# Patient Record
Sex: FEMALE | Race: WHITE | NOT HISPANIC OR LATINO | Employment: OTHER | URBAN - METROPOLITAN AREA
[De-identification: names, ages, dates, MRNs, and addresses within clinical notes are randomized per-mention and may not be internally consistent; named-entity substitution may affect disease eponyms.]

---

## 2017-11-10 ENCOUNTER — TRANSCRIBE ORDERS (OUTPATIENT)
Dept: ADMINISTRATIVE | Facility: HOSPITAL | Age: 42
End: 2017-11-10

## 2017-11-10 DIAGNOSIS — Z12.31 VISIT FOR SCREENING MAMMOGRAM: Primary | ICD-10-CM

## 2017-12-21 ENCOUNTER — APPOINTMENT (OUTPATIENT)
Dept: MAMMOGRAPHY | Facility: HOSPITAL | Age: 42
End: 2017-12-21
Attending: OBSTETRICS & GYNECOLOGY

## 2018-01-26 ENCOUNTER — APPOINTMENT (OUTPATIENT)
Dept: MAMMOGRAPHY | Facility: HOSPITAL | Age: 43
End: 2018-01-26
Attending: OBSTETRICS & GYNECOLOGY

## 2018-02-22 ENCOUNTER — APPOINTMENT (OUTPATIENT)
Dept: MAMMOGRAPHY | Facility: HOSPITAL | Age: 43
End: 2018-02-22
Attending: OBSTETRICS & GYNECOLOGY

## 2018-11-07 ENCOUNTER — APPOINTMENT (OUTPATIENT)
Dept: CT IMAGING | Facility: HOSPITAL | Age: 43
End: 2018-11-07

## 2018-11-07 ENCOUNTER — HOSPITAL ENCOUNTER (EMERGENCY)
Facility: HOSPITAL | Age: 43
Discharge: HOME | End: 2018-11-08
Attending: EMERGENCY MEDICINE | Admitting: EMERGENCY MEDICINE

## 2018-11-07 DIAGNOSIS — N23 RENAL COLIC ON RIGHT SIDE: ICD-10-CM

## 2018-11-07 DIAGNOSIS — Z86.59 HISTORY OF DEPRESSION: ICD-10-CM

## 2018-11-07 DIAGNOSIS — Z86.59 HISTORY OF ATTENTION DEFICIT DISORDER: ICD-10-CM

## 2018-11-07 DIAGNOSIS — N39.0 ACUTE UTI (URINARY TRACT INFECTION): Primary | ICD-10-CM

## 2018-11-07 LAB
ALBUMIN SERPL-MCNC: 4.76 G/DL (ref 3.2–4.8)
ALBUMIN/GLOB SERPL: 1.8 G/DL (ref 1.5–2.5)
ALP SERPL-CCNC: 106 U/L (ref 25–100)
ALT SERPL W P-5'-P-CCNC: 14 U/L (ref 7–40)
ANION GAP SERPL CALCULATED.3IONS-SCNC: 6 MMOL/L (ref 3–11)
AST SERPL-CCNC: 21 U/L (ref 0–33)
B-HCG UR QL: NEGATIVE
BACTERIA UR QL AUTO: ABNORMAL /HPF
BASOPHILS # BLD AUTO: 0.04 10*3/MM3 (ref 0–0.2)
BASOPHILS NFR BLD AUTO: 0.3 % (ref 0–1)
BILIRUB SERPL-MCNC: 0.5 MG/DL (ref 0.3–1.2)
BILIRUB UR QL STRIP: NEGATIVE
BUN BLD-MCNC: 9 MG/DL (ref 9–23)
BUN/CREAT SERPL: 13.6 (ref 7–25)
CALCIUM SPEC-SCNC: 9.2 MG/DL (ref 8.7–10.4)
CHLORIDE SERPL-SCNC: 102 MMOL/L (ref 99–109)
CLARITY UR: ABNORMAL
CO2 SERPL-SCNC: 28 MMOL/L (ref 20–31)
COLOR UR: YELLOW
CREAT BLD-MCNC: 0.66 MG/DL (ref 0.6–1.3)
DEPRECATED RDW RBC AUTO: 48.3 FL (ref 37–54)
EOSINOPHIL # BLD AUTO: 0.29 10*3/MM3 (ref 0–0.3)
EOSINOPHIL NFR BLD AUTO: 2.4 % (ref 0–3)
ERYTHROCYTE [DISTWIDTH] IN BLOOD BY AUTOMATED COUNT: 14.2 % (ref 11.3–14.5)
GFR SERPL CREATININE-BSD FRML MDRD: 97 ML/MIN/1.73
GLOBULIN UR ELPH-MCNC: 2.6 GM/DL
GLUCOSE BLD-MCNC: 89 MG/DL (ref 70–100)
GLUCOSE UR STRIP-MCNC: NEGATIVE MG/DL
HCT VFR BLD AUTO: 42.4 % (ref 34.5–44)
HGB BLD-MCNC: 14.5 G/DL (ref 11.5–15.5)
HGB UR QL STRIP.AUTO: ABNORMAL
HOLD SPECIMEN: NORMAL
HOLD SPECIMEN: NORMAL
HYALINE CASTS UR QL AUTO: ABNORMAL /LPF
IMM GRANULOCYTES # BLD: 0.04 10*3/MM3 (ref 0–0.03)
IMM GRANULOCYTES NFR BLD: 0.3 % (ref 0–0.6)
INTERNAL NEGATIVE CONTROL: NEGATIVE
INTERNAL POSITIVE CONTROL: POSITIVE
KETONES UR QL STRIP: NEGATIVE
LEUKOCYTE ESTERASE UR QL STRIP.AUTO: ABNORMAL
LIPASE SERPL-CCNC: 40 U/L (ref 6–51)
LYMPHOCYTES # BLD AUTO: 3.35 10*3/MM3 (ref 0.6–4.8)
LYMPHOCYTES NFR BLD AUTO: 27.3 % (ref 24–44)
Lab: NORMAL
MCH RBC QN AUTO: 32 PG (ref 27–31)
MCHC RBC AUTO-ENTMCNC: 34.2 G/DL (ref 32–36)
MCV RBC AUTO: 93.6 FL (ref 80–99)
MONOCYTES # BLD AUTO: 1.4 10*3/MM3 (ref 0–1)
MONOCYTES NFR BLD AUTO: 11.4 % (ref 0–12)
NEUTROPHILS # BLD AUTO: 7.2 10*3/MM3 (ref 1.5–8.3)
NEUTROPHILS NFR BLD AUTO: 58.6 % (ref 41–71)
NITRITE UR QL STRIP: NEGATIVE
PH UR STRIP.AUTO: 5.5 [PH] (ref 5–8)
PLATELET # BLD AUTO: 397 10*3/MM3 (ref 150–450)
PMV BLD AUTO: 9.5 FL (ref 6–12)
POTASSIUM BLD-SCNC: 3.8 MMOL/L (ref 3.5–5.5)
PROT SERPL-MCNC: 7.4 G/DL (ref 5.7–8.2)
PROT UR QL STRIP: NEGATIVE
RBC # BLD AUTO: 4.53 10*6/MM3 (ref 3.89–5.14)
RBC # UR: ABNORMAL /HPF
REF LAB TEST METHOD: ABNORMAL
SODIUM BLD-SCNC: 136 MMOL/L (ref 132–146)
SP GR UR STRIP: 1.01 (ref 1–1.03)
SQUAMOUS #/AREA URNS HPF: ABNORMAL /HPF
UROBILINOGEN UR QL STRIP: ABNORMAL
WBC NRBC COR # BLD: 12.28 10*3/MM3 (ref 3.5–10.8)
WBC UR QL AUTO: ABNORMAL /HPF
WHOLE BLOOD HOLD SPECIMEN: NORMAL
WHOLE BLOOD HOLD SPECIMEN: NORMAL

## 2018-11-07 PROCEDURE — 81001 URINALYSIS AUTO W/SCOPE: CPT

## 2018-11-07 PROCEDURE — 25010000002 IOPAMIDOL 61 % SOLUTION: Performed by: EMERGENCY MEDICINE

## 2018-11-07 PROCEDURE — 81025 URINE PREGNANCY TEST: CPT

## 2018-11-07 PROCEDURE — 99284 EMERGENCY DEPT VISIT MOD MDM: CPT

## 2018-11-07 PROCEDURE — 80053 COMPREHEN METABOLIC PANEL: CPT

## 2018-11-07 PROCEDURE — 87077 CULTURE AEROBIC IDENTIFY: CPT | Performed by: PHYSICIAN ASSISTANT

## 2018-11-07 PROCEDURE — 83690 ASSAY OF LIPASE: CPT

## 2018-11-07 PROCEDURE — 87186 SC STD MICRODIL/AGAR DIL: CPT | Performed by: PHYSICIAN ASSISTANT

## 2018-11-07 PROCEDURE — 85025 COMPLETE CBC W/AUTO DIFF WBC: CPT

## 2018-11-07 PROCEDURE — 87086 URINE CULTURE/COLONY COUNT: CPT | Performed by: PHYSICIAN ASSISTANT

## 2018-11-07 PROCEDURE — 96360 HYDRATION IV INFUSION INIT: CPT

## 2018-11-07 PROCEDURE — 74177 CT ABD & PELVIS W/CONTRAST: CPT

## 2018-11-07 RX ORDER — ACETAMINOPHEN 500 MG
1000 TABLET ORAL ONCE
Status: COMPLETED | OUTPATIENT
Start: 2018-11-07 | End: 2018-11-08

## 2018-11-07 RX ORDER — SODIUM CHLORIDE 0.9 % (FLUSH) 0.9 %
10 SYRINGE (ML) INJECTION AS NEEDED
Status: DISCONTINUED | OUTPATIENT
Start: 2018-11-07 | End: 2018-11-08 | Stop reason: HOSPADM

## 2018-11-07 RX ORDER — CEFDINIR 300 MG/1
300 CAPSULE ORAL 2 TIMES DAILY
Qty: 14 CAPSULE | Refills: 0 | Status: SHIPPED | OUTPATIENT
Start: 2018-11-07 | End: 2020-10-05

## 2018-11-07 RX ORDER — CEFDINIR 300 MG/1
300 CAPSULE ORAL EVERY 12 HOURS SCHEDULED
Status: COMPLETED | OUTPATIENT
Start: 2018-11-07 | End: 2018-11-08

## 2018-11-07 RX ADMIN — IOPAMIDOL 85 ML: 612 INJECTION, SOLUTION INTRAVENOUS at 21:24

## 2018-11-07 RX ADMIN — SODIUM CHLORIDE 1000 ML: 9 INJECTION, SOLUTION INTRAVENOUS at 21:43

## 2018-11-07 RX ADMIN — Medication 10 ML: at 20:40

## 2018-11-08 VITALS
RESPIRATION RATE: 18 BRPM | TEMPERATURE: 98.3 F | OXYGEN SATURATION: 99 % | HEIGHT: 65 IN | HEART RATE: 77 BPM | BODY MASS INDEX: 20.83 KG/M2 | SYSTOLIC BLOOD PRESSURE: 122 MMHG | DIASTOLIC BLOOD PRESSURE: 71 MMHG | WEIGHT: 125 LBS

## 2018-11-08 RX ADMIN — ACETAMINOPHEN 1000 MG: 500 TABLET, FILM COATED ORAL at 00:01

## 2018-11-08 RX ADMIN — CEFDINIR 300 MG: 300 CAPSULE ORAL at 00:00

## 2018-11-08 NOTE — ED PROVIDER NOTES
Subjective   Katie Egan is a 42 y.o.female who presents to the ED with c/o abdominal pain with onset two days ago. The patient reports that this past Monday, she suddenly experienced a pain in the right side of her abdomen, which has been constant and worsened since onset. She describes her pain as a pressure sensation, which now radiates into the periumbilical region. She denies any modifying factors of her abdominal pain, such as movement or eating. She reports taking Ibuprofen for her pain, without any relief. The patient also complains of recent fatigue, decreased appetite, and increased bowel movements, with 2-3 BM's per day. But she denies any abnormal BM's. She reports her last BM was several hours ago, which as normal. She also denies any back pain, nausea, vomiting, dysuria, frequency, hematuria, vaginal bleeding/discharge, or any other acute complaints at this time. She reports her last menstrual period was 10/29. 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 She reports she has past medical history of anxiety and ADD, for which she takes several medications, such as Adderall.        History provided by:  Patient  Abdominal Pain   Pain location: right side.  Pain quality: pressure    Pain radiates to:  Periumbilical region  Pain severity:  Moderate  Onset quality:  Sudden  Duration:  2 days  Timing:  Constant  Progression:  Worsening  Chronicity:  New  Context: previous surgery    Relieved by:  Nothing  Worsened by:  Nothing  Ineffective treatments: Ibuprofen.  Associated symptoms: fatigue    Associated symptoms: no chills, no constipation, no diarrhea, no dysuria, no hematuria, no nausea, no vaginal bleeding, no vaginal discharge and no vomiting    Risk factors: multiple surgeries        Review of Systems   Constitutional: Positive for appetite change (decreased appetite) and fatigue. Negative for chills.   Gastrointestinal: Positive for abdominal  pain. Negative for constipation, diarrhea, nausea and vomiting.        Increased BM's, but no abnormal BM's   Genitourinary: Negative for dysuria, frequency, hematuria, vaginal bleeding and vaginal discharge.        History of recent UTI.  Treated with course of Bactrim DS.   Musculoskeletal: Negative for back pain.   All other systems reviewed and are negative.      Past Medical History:   Diagnosis Date   • ADD (attention deficit disorder)    • Anxiety    • Depression        No Known Allergies    Past Surgical History:   Procedure Laterality Date   •  SECTION     • SPLENECTOMY     • TONSILLECTOMY         History reviewed. No pertinent family history.    Social History     Social History   • Marital status:      Social History Main Topics   • Smoking status: Never Smoker   • Smokeless tobacco: Never Used   • Alcohol use Yes      Comment: rare/social   • Drug use: No   • Sexual activity: Defer     Other Topics Concern   • Not on file         Objective   Physical Exam   Constitutional: She is oriented to person, place, and time. She appears well-developed and well-nourished. No distress.   HENT:   Head: Normocephalic and atraumatic.   Mouth/Throat: Oropharynx is clear and moist.   Eyes: Pupils are equal, round, and reactive to light. Conjunctivae and EOM are normal. No scleral icterus.   Neck: Normal range of motion. Neck supple.   Cardiovascular: Normal rate, regular rhythm and normal heart sounds.    No murmur heard.  Pulmonary/Chest: Effort normal and breath sounds normal. No respiratory distress.   Abdominal: Soft. Bowel sounds are normal. There is no hepatosplenomegaly. There is tenderness in the right lower quadrant. There is tenderness at McBurney's point. There is no rebound, no guarding, no CVA tenderness and negative Frederick's sign. No hernia.       TTP to the right lower quadrant and right mid abdomen with deep palpation. No flank or CVA tenderness. Negative Rovsing's sign.  Negative psoas  and negative obturator.  TTP at McBurney's point, but no rebound or guarding.   Musculoskeletal: Normal range of motion. She exhibits no tenderness.   Neurological: She is alert and oriented to person, place, and time.   Skin: Skin is warm and dry. She is not diaphoretic.   Psychiatric: She has a normal mood and affect. Her behavior is normal.   Nursing note and vitals reviewed.      Procedures         ED Course  ED Course as of Nov 07 2347 Wed Nov 07, 2018   2320 CBC showed mild leukocytosis with a white blood cell count of 12.28.  Differential was within normal limits.  Chemistries were within normal limits.  Urine hCG was negative.  Urinalysis did reveal 21-30 white blood cells and small leukocyte esterase with 13-20 red blood cells.  I ordered a urine culture and that is in process.  CT scan of the abdomen and pelvis with IV contrast revealed no evidence of kidney or ureteral stones and no stranding consistent with pyelonephritis.  There was also no inflammatory changes in the right lower quadrant and no other acute intra-abdominal or intrapelvic pathology.  I will go discussed all results with patient and we will prescribe Omnicef twice daily for 1 week.  We also will give patient primary care physician phone numbers for follow-up.  [FC]   2345 Discussed all results with patient and reexamined abdomen.  Abdominal exam is benign and nonsurgical.  [FC]      ED Course User Index  [FC] Ramona Willett PA-C      Recent Results (from the past 24 hour(s))   Comprehensive Metabolic Panel    Collection Time: 11/07/18  8:25 PM   Result Value Ref Range    Glucose 89 70 - 100 mg/dL    BUN 9 9 - 23 mg/dL    Creatinine 0.66 0.60 - 1.30 mg/dL    Sodium 136 132 - 146 mmol/L    Potassium 3.8 3.5 - 5.5 mmol/L    Chloride 102 99 - 109 mmol/L    CO2 28.0 20.0 - 31.0 mmol/L    Calcium 9.2 8.7 - 10.4 mg/dL    Total Protein 7.4 5.7 - 8.2 g/dL    Albumin 4.76 3.20 - 4.80 g/dL    ALT (SGPT) 14 7 - 40 U/L    AST (SGOT) 21 0 - 33 U/L     Alkaline Phosphatase 106 (H) 25 - 100 U/L    Total Bilirubin 0.5 0.3 - 1.2 mg/dL    eGFR Non African Amer 97 >60 mL/min/1.73    Globulin 2.6 gm/dL    A/G Ratio 1.8 1.5 - 2.5 g/dL    BUN/Creatinine Ratio 13.6 7.0 - 25.0    Anion Gap 6.0 3.0 - 11.0 mmol/L   Lipase    Collection Time: 11/07/18  8:25 PM   Result Value Ref Range    Lipase 40 6 - 51 U/L   Light Blue Top    Collection Time: 11/07/18  8:25 PM   Result Value Ref Range    Extra Tube hold for add-on    Green Top (Gel)    Collection Time: 11/07/18  8:25 PM   Result Value Ref Range    Extra Tube Hold for add-ons.    Lavender Top    Collection Time: 11/07/18  8:25 PM   Result Value Ref Range    Extra Tube hold for add-on    Gold Top - SST    Collection Time: 11/07/18  8:25 PM   Result Value Ref Range    Extra Tube Hold for add-ons.    CBC Auto Differential    Collection Time: 11/07/18  8:25 PM   Result Value Ref Range    WBC 12.28 (H) 3.50 - 10.80 10*3/mm3    RBC 4.53 3.89 - 5.14 10*6/mm3    Hemoglobin 14.5 11.5 - 15.5 g/dL    Hematocrit 42.4 34.5 - 44.0 %    MCV 93.6 80.0 - 99.0 fL    MCH 32.0 (H) 27.0 - 31.0 pg    MCHC 34.2 32.0 - 36.0 g/dL    RDW 14.2 11.3 - 14.5 %    RDW-SD 48.3 37.0 - 54.0 fl    MPV 9.5 6.0 - 12.0 fL    Platelets 397 150 - 450 10*3/mm3    Neutrophil % 58.6 41.0 - 71.0 %    Lymphocyte % 27.3 24.0 - 44.0 %    Monocyte % 11.4 0.0 - 12.0 %    Eosinophil % 2.4 0.0 - 3.0 %    Basophil % 0.3 0.0 - 1.0 %    Immature Grans % 0.3 0.0 - 0.6 %    Neutrophils, Absolute 7.20 1.50 - 8.30 10*3/mm3    Lymphocytes, Absolute 3.35 0.60 - 4.80 10*3/mm3    Monocytes, Absolute 1.40 (H) 0.00 - 1.00 10*3/mm3    Eosinophils, Absolute 0.29 0.00 - 0.30 10*3/mm3    Basophils, Absolute 0.04 0.00 - 0.20 10*3/mm3    Immature Grans, Absolute 0.04 (H) 0.00 - 0.03 10*3/mm3   Urinalysis With Microscopic If Indicated (No Culture) - Urine, Clean Catch    Collection Time: 11/07/18  8:39 PM   Result Value Ref Range    Color, UA Yellow Yellow, Straw    Appearance, UA Cloudy (A)  "Clear    pH, UA 5.5 5.0 - 8.0    Specific Gravity, UA 1.015 1.001 - 1.030    Glucose, UA Negative Negative    Ketones, UA Negative Negative    Bilirubin, UA Negative Negative    Blood, UA Moderate (2+) (A) Negative    Protein, UA Negative Negative    Leuk Esterase, UA Small (1+) (A) Negative    Nitrite, UA Negative Negative    Urobilinogen, UA 0.2 E.U./dL 0.2 - 1.0 E.U./dL   Urinalysis, Microscopic Only - Urine, Clean Catch    Collection Time: 11/07/18  8:39 PM   Result Value Ref Range    RBC, UA 13-20 (A) None Seen, 0-2 /HPF    WBC, UA 21-30 (A) None Seen, 0-2 /HPF    Bacteria, UA None Seen None Seen, Trace /HPF    Squamous Epithelial Cells, UA 0-2 None Seen, 0-2 /HPF    Hyaline Casts, UA 7-12 0 - 6 /LPF    Methodology Automated Microscopy    POCT pregnancy, urine    Collection Time: 11/07/18  9:00 PM   Result Value Ref Range    HCG, Urine, QL Negative Negative    Lot Number [id number]     Internal Positive Control Positive     Internal Negative Control Negative      Note: In addition to lab results from this visit, the labs listed above may include labs taken at another facility or during a different encounter within the last 24 hours. Please correlate lab times with ED admission and discharge times for further clarification of the services performed during this visit.    CT Abdomen Pelvis With Contrast   Final Result   No visualized acute abdominal or pelvic abnormality. The appendix is not    identified.       THIS DOCUMENT HAS BEEN ELECTRONICALLY SIGNED BY KATINA JACOBSON MD        Vitals:    11/07/18 2013 11/07/18 2135 11/07/18 2230   BP: 133/80 126/80 123/71   Pulse: 103 84 81   Resp: 16 16 16   Temp: 98.3 °F (36.8 °C)     SpO2: 100% 100% 100%   Weight: 56.7 kg (125 lb)     Height: 165.1 cm (65\")       Medications   sodium chloride 0.9 % flush 10 mL (10 mL Intravenous Given 11/7/18 2040)   cefdinir (OMNICEF) capsule 300 mg (not administered)   acetaminophen (TYLENOL) tablet 1,000 mg (not administered)   sodium " chloride 0.9 % bolus 1,000 mL (0 mL Intravenous Stopped 11/7/18 2241)   iopamidol (ISOVUE-300) 61 % injection 100 mL (85 mL Intravenous Given 11/7/18 2124)     ECG/EMG Results (last 24 hours)     ** No results found for the last 24 hours. **                        MDM    Final diagnoses:   Acute UTI (urinary tract infection)   Renal colic on right side   History of attention deficit disorder   History of depression       Documentation assistance provided by bill Samuels.  Information recorded by the bill was done at my direction and has been verified and validated by me.     Flavio Samuels  11/07/18 2116       Flavio Samuels  11/07/18 2220       Flavio Samuels  11/07/18 2307       Ramona Willett PA-C  11/07/18 3426

## 2018-11-08 NOTE — DISCHARGE INSTRUCTIONS
Take Omnicef as prescribed.  Increase fluid intake.  Avoid caffeine.  Immediately return to the Emergency Department if symptoms worsen or with any other acute complaints.     Follow up with one of the [facility] Primary Care Providers below to setup primary care, with the next available appointment for reevaluation. If you need assistance coordinating a primary care appointment with a [facility] Primary Care Provider, please contact the Primary Care Coordinators at [phone] for appointment scheduling.    [facility], Primary Care   [address], [address]   [place], [place] [id number] [phone]    [facility] Internal Medicine & Endocrinology  [address], [address]  [place] [address] [phone]    [facility] Family Medicine  [address], [address]   [place] [address] [phone]    [facility] Primary [facility]  [address], [address]  [place], [place] [id number] [phone]    [facility], Primary Care,   [address], [address]   [place], [place] [address] [phone]    [facility] Primary [facility]  [address], [address]  [place], [place] [address] [phone]    [facility], Primary Care  [address], [address]  [place], [place] [id number] [phone]    [facility] Internal Medicine & Pediatrics  [address], [address]   [place] [address] [phone]    [facility], Primary Care  [address] [place] [address] [phone]      [facility] Primary [facility]  [address], [address]   [place] [address] [phone]    [facility]  [address] [place] [address] [phone]

## 2018-11-10 LAB — BACTERIA SPEC AEROBE CULT: ABNORMAL

## 2020-09-29 ENCOUNTER — TRANSCRIBE ORDERS (OUTPATIENT)
Dept: ADMINISTRATIVE | Facility: HOSPITAL | Age: 45
End: 2020-09-29

## 2020-09-29 DIAGNOSIS — Z12.31 VISIT FOR SCREENING MAMMOGRAM: Primary | ICD-10-CM

## 2021-04-20 ENCOUNTER — TRANSCRIBE ORDERS (OUTPATIENT)
Dept: ADMINISTRATIVE | Facility: HOSPITAL | Age: 46
End: 2021-04-20

## 2021-04-20 DIAGNOSIS — Z12.31 VISIT FOR SCREENING MAMMOGRAM: Primary | ICD-10-CM

## 2021-06-21 ENCOUNTER — HOSPITAL ENCOUNTER (OUTPATIENT)
Dept: MAMMOGRAPHY | Facility: HOSPITAL | Age: 46
Discharge: HOME | End: 2021-06-21
Admitting: OBSTETRICS & GYNECOLOGY

## 2021-06-21 DIAGNOSIS — Z12.31 VISIT FOR SCREENING MAMMOGRAM: ICD-10-CM

## 2021-06-21 PROCEDURE — 77063 BREAST TOMOSYNTHESIS BI: CPT

## 2021-06-21 PROCEDURE — 77067 SCR MAMMO BI INCL CAD: CPT

## 2021-06-21 PROCEDURE — 77063 BREAST TOMOSYNTHESIS BI: CPT | Performed by: RADIOLOGY

## 2021-06-21 PROCEDURE — 77067 SCR MAMMO BI INCL CAD: CPT | Performed by: RADIOLOGY

## 2021-07-19 ENCOUNTER — HOSPITAL ENCOUNTER (OUTPATIENT)
Dept: MAMMOGRAPHY | Facility: HOSPITAL | Age: 46
Discharge: HOME | End: 2021-07-19
Admitting: RADIOLOGY

## 2021-07-19 ENCOUNTER — TRANSCRIBE ORDERS (OUTPATIENT)
Dept: MAMMOGRAPHY | Facility: HOSPITAL | Age: 46
End: 2021-07-19

## 2021-07-19 DIAGNOSIS — R92.8 ABNORMAL MAMMOGRAM: Primary | ICD-10-CM

## 2021-07-19 DIAGNOSIS — R92.8 ABNORMAL MAMMOGRAM: ICD-10-CM

## 2021-07-19 PROCEDURE — 77062 BREAST TOMOSYNTHESIS BI: CPT | Performed by: RADIOLOGY

## 2021-07-19 PROCEDURE — 77066 DX MAMMO INCL CAD BI: CPT

## 2021-07-19 PROCEDURE — G0279 TOMOSYNTHESIS, MAMMO: HCPCS

## 2021-07-19 PROCEDURE — 77066 DX MAMMO INCL CAD BI: CPT | Performed by: RADIOLOGY

## 2021-07-21 ENCOUNTER — HOSPITAL ENCOUNTER (OUTPATIENT)
Dept: MAMMOGRAPHY | Facility: HOSPITAL | Age: 46
Discharge: HOME | End: 2021-07-21

## 2021-07-21 DIAGNOSIS — R92.8 ABNORMAL MAMMOGRAM: ICD-10-CM

## 2021-07-21 PROCEDURE — 77065 DX MAMMO INCL CAD UNI: CPT | Performed by: RADIOLOGY

## 2021-07-21 PROCEDURE — 19081 BX BREAST 1ST LESION STRTCTC: CPT | Performed by: RADIOLOGY

## 2021-07-21 PROCEDURE — 25010000003 LIDOCAINE 1 % SOLUTION: Performed by: OBSTETRICS & GYNECOLOGY

## 2021-07-21 PROCEDURE — 88305 TISSUE EXAM BY PATHOLOGIST: CPT | Performed by: OBSTETRICS & GYNECOLOGY

## 2021-07-21 PROCEDURE — 76098 X-RAY EXAM SURGICAL SPECIMEN: CPT

## 2021-07-21 PROCEDURE — A4648 IMPLANTABLE TISSUE MARKER: HCPCS

## 2021-07-21 RX ORDER — LIDOCAINE HYDROCHLORIDE AND EPINEPHRINE 10; 10 MG/ML; UG/ML
10 INJECTION, SOLUTION INFILTRATION; PERINEURAL ONCE
Status: COMPLETED | OUTPATIENT
Start: 2021-07-21 | End: 2021-07-21

## 2021-07-21 RX ORDER — LIDOCAINE HYDROCHLORIDE 10 MG/ML
5 INJECTION, SOLUTION INFILTRATION; PERINEURAL ONCE
Status: COMPLETED | OUTPATIENT
Start: 2021-07-21 | End: 2021-07-21

## 2021-07-21 RX ADMIN — LIDOCAINE HYDROCHLORIDE 5 ML: 10 INJECTION, SOLUTION INFILTRATION; PERINEURAL at 13:49

## 2021-07-21 RX ADMIN — LIDOCAINE HYDROCHLORIDE,EPINEPHRINE BITARTRATE 10 ML: 10; .01 INJECTION, SOLUTION INFILTRATION; PERINEURAL at 13:50

## 2021-07-21 NOTE — PROGRESS NOTES
Alert and orientated. Denies discomfort. No active bleeding. Steri-strips not visualized, gauze dressing reinforced. Ice packs given. Verbalizes and demonstrates understanding of post-care instructions, written copy given.

## 2021-07-22 ENCOUNTER — TELEPHONE (OUTPATIENT)
Dept: MAMMOGRAPHY | Facility: HOSPITAL | Age: 46
End: 2021-07-22

## 2021-07-22 LAB
CYTO UR: NORMAL
LAB AP CASE REPORT: NORMAL
LAB AP CLINICAL INFORMATION: NORMAL
LAB AP DIAGNOSIS COMMENT: NORMAL
PATH REPORT.FINAL DX SPEC: NORMAL
PATH REPORT.GROSS SPEC: NORMAL

## 2021-07-22 NOTE — TELEPHONE ENCOUNTER
Pt notified of pathology results and recommendation. Verbalizes understanding. States having some discomfort, denies needing analgesics.  Denies signs and symptoms of infection. Questions answered, verbalized understanding.

## 2021-07-23 ENCOUNTER — TRANSCRIBE ORDERS (OUTPATIENT)
Dept: MAMMOGRAPHY | Facility: HOSPITAL | Age: 46
End: 2021-07-23

## 2021-07-23 DIAGNOSIS — R92.8 ABNORMAL MAMMOGRAM: Primary | ICD-10-CM

## 2022-10-05 ENCOUNTER — TRANSCRIBE ORDERS (OUTPATIENT)
Dept: ADMINISTRATIVE | Facility: HOSPITAL | Age: 47
End: 2022-10-05

## 2022-10-05 DIAGNOSIS — R92.8 ABNORMAL MAMMOGRAM: Primary | ICD-10-CM
